# Patient Record
Sex: MALE | Race: WHITE | NOT HISPANIC OR LATINO | URBAN - METROPOLITAN AREA
[De-identification: names, ages, dates, MRNs, and addresses within clinical notes are randomized per-mention and may not be internally consistent; named-entity substitution may affect disease eponyms.]

---

## 2020-01-17 ENCOUNTER — EMERGENCY (EMERGENCY)
Facility: HOSPITAL | Age: 45
LOS: 1 days | Discharge: HOME | End: 2020-01-17
Attending: EMERGENCY MEDICINE
Payer: COMMERCIAL

## 2020-01-17 VITALS
RESPIRATION RATE: 18 BRPM | WEIGHT: 192.9 LBS | HEIGHT: 74 IN | DIASTOLIC BLOOD PRESSURE: 83 MMHG | SYSTOLIC BLOOD PRESSURE: 125 MMHG | TEMPERATURE: 98 F | OXYGEN SATURATION: 98 % | HEART RATE: 64 BPM

## 2020-01-17 LAB
ALBUMIN SERPL ELPH-MCNC: 4.6 G/DL — SIGNIFICANT CHANGE UP (ref 3.3–5)
ALP SERPL-CCNC: 61 U/L — SIGNIFICANT CHANGE UP (ref 40–120)
ALT FLD-CCNC: 17 U/L — SIGNIFICANT CHANGE UP (ref 10–45)
ANION GAP SERPL CALC-SCNC: 16 MMOL/L — SIGNIFICANT CHANGE UP (ref 5–17)
APTT BLD: 32.1 SEC — SIGNIFICANT CHANGE UP (ref 27.5–36.3)
AST SERPL-CCNC: 20 U/L — SIGNIFICANT CHANGE UP (ref 10–40)
BASOPHILS # BLD AUTO: 0.04 K/UL — SIGNIFICANT CHANGE UP (ref 0–0.2)
BASOPHILS NFR BLD AUTO: 0.9 % — SIGNIFICANT CHANGE UP (ref 0–2)
BILIRUB SERPL-MCNC: 0.4 MG/DL — SIGNIFICANT CHANGE UP (ref 0.2–1.2)
BUN SERPL-MCNC: 17 MG/DL — SIGNIFICANT CHANGE UP (ref 7–23)
CALCIUM SERPL-MCNC: 9.6 MG/DL — SIGNIFICANT CHANGE UP (ref 8.4–10.5)
CHLORIDE SERPL-SCNC: 104 MMOL/L — SIGNIFICANT CHANGE UP (ref 96–108)
CO2 SERPL-SCNC: 21 MMOL/L — LOW (ref 22–31)
CREAT SERPL-MCNC: 0.98 MG/DL — SIGNIFICANT CHANGE UP (ref 0.5–1.3)
EOSINOPHIL # BLD AUTO: 0.18 K/UL — SIGNIFICANT CHANGE UP (ref 0–0.5)
EOSINOPHIL NFR BLD AUTO: 3.9 % — SIGNIFICANT CHANGE UP (ref 0–6)
GLUCOSE SERPL-MCNC: 127 MG/DL — HIGH (ref 70–99)
HCT VFR BLD CALC: 44.7 % — SIGNIFICANT CHANGE UP (ref 39–50)
HGB BLD-MCNC: 15 G/DL — SIGNIFICANT CHANGE UP (ref 13–17)
IMM GRANULOCYTES NFR BLD AUTO: 0.2 % — SIGNIFICANT CHANGE UP (ref 0–1.5)
INR BLD: 1.07 RATIO — SIGNIFICANT CHANGE UP (ref 0.88–1.16)
LYMPHOCYTES # BLD AUTO: 1.97 K/UL — SIGNIFICANT CHANGE UP (ref 1–3.3)
LYMPHOCYTES # BLD AUTO: 42.4 % — SIGNIFICANT CHANGE UP (ref 13–44)
MCHC RBC-ENTMCNC: 30.1 PG — SIGNIFICANT CHANGE UP (ref 27–34)
MCHC RBC-ENTMCNC: 33.6 GM/DL — SIGNIFICANT CHANGE UP (ref 32–36)
MCV RBC AUTO: 89.6 FL — SIGNIFICANT CHANGE UP (ref 80–100)
MONOCYTES # BLD AUTO: 0.64 K/UL — SIGNIFICANT CHANGE UP (ref 0–0.9)
MONOCYTES NFR BLD AUTO: 13.8 % — SIGNIFICANT CHANGE UP (ref 2–14)
NEUTROPHILS # BLD AUTO: 1.81 K/UL — SIGNIFICANT CHANGE UP (ref 1.8–7.4)
NEUTROPHILS NFR BLD AUTO: 38.8 % — LOW (ref 43–77)
NRBC # BLD: 0 /100 WBCS — SIGNIFICANT CHANGE UP (ref 0–0)
PLATELET # BLD AUTO: 235 K/UL — SIGNIFICANT CHANGE UP (ref 150–400)
POTASSIUM SERPL-MCNC: 4 MMOL/L — SIGNIFICANT CHANGE UP (ref 3.5–5.3)
POTASSIUM SERPL-SCNC: 4 MMOL/L — SIGNIFICANT CHANGE UP (ref 3.5–5.3)
PROT SERPL-MCNC: 7.4 G/DL — SIGNIFICANT CHANGE UP (ref 6–8.3)
PROTHROM AB SERPL-ACNC: 12.2 SEC — SIGNIFICANT CHANGE UP (ref 10–12.9)
RBC # BLD: 4.99 M/UL — SIGNIFICANT CHANGE UP (ref 4.2–5.8)
RBC # FLD: 11.7 % — SIGNIFICANT CHANGE UP (ref 10.3–14.5)
RH IG SCN BLD-IMP: POSITIVE — SIGNIFICANT CHANGE UP
SODIUM SERPL-SCNC: 141 MMOL/L — SIGNIFICANT CHANGE UP (ref 135–145)
WBC # BLD: 4.65 K/UL — SIGNIFICANT CHANGE UP (ref 3.8–10.5)
WBC # FLD AUTO: 4.65 K/UL — SIGNIFICANT CHANGE UP (ref 3.8–10.5)

## 2020-01-17 PROCEDURE — 99284 EMERGENCY DEPT VISIT MOD MDM: CPT

## 2020-01-17 PROCEDURE — 72141 MRI NECK SPINE W/O DYE: CPT | Mod: 26

## 2020-01-17 PROCEDURE — 70498 CT ANGIOGRAPHY NECK: CPT | Mod: 26

## 2020-01-17 PROCEDURE — 70450 CT HEAD/BRAIN W/O DYE: CPT | Mod: 26,59

## 2020-01-17 PROCEDURE — 72125 CT NECK SPINE W/O DYE: CPT | Mod: 26

## 2020-01-17 PROCEDURE — 70496 CT ANGIOGRAPHY HEAD: CPT | Mod: 26

## 2020-01-17 NOTE — ED PROVIDER NOTE - NS ED ROS FT
Constitutional: no fevers or chills  HEENT: no visual changes, no sore throat, no rhinorrhea  NECK: neck pain  CV: no cp or palpitations  Resp: no sob or cough  GI: no abd pain, no nausea, no vomiting, no diarrhea, no constipation  : no dysuria, no hematuria  MSK: no myalgais or arthralgias  skin: no rashes  neuro: no HA, Right hand numbness; no weakness, no tingling  ROS statement: all other ROS negative except as per HPI
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
## 2020-01-17 NOTE — ED PROVIDER NOTE - PATIENT PORTAL LINK FT
You can access the FollowMyHealth Patient Portal offered by [facility] by registering at the following website: [email or web address]. By joining [facility]’s FollowMyHealth portal, you will also be able to view your health information using other applications (apps) compatible with our system.
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
## 2020-01-17 NOTE — ED PROVIDER NOTE - CARE PLAN
Principal Discharge DX:	Closed nondisplaced fracture of seventh cervical vertebra, unspecified fracture morphology, initial encounter

## 2020-01-17 NOTE — ED PROVIDER NOTE - ADDITIONAL NOTES AND INSTRUCTIONS:
No heavy lifting more than 5 pounds. No contact sports. Please keep the cervical collar on at all times until you can follow up with the orthopedic spine surgeon.

## 2020-01-17 NOTE — ED ADULT NURSE NOTE - PMH
Alcohol abuse    Anxiety disorder    Depression    DTs (delirium tremens)    Opiate addiction    Thrombocythemia
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
## 2020-01-17 NOTE — ED PROVIDER NOTE - NSFOLLOWUPINSTRUCTIONS_ED_ALL_ED_FT
Please take Acetaminophen over the counter. Use as directed and see medication warnings.  Please take Ibuprofen over the counter. Use as directed and see medication warnings.    Please keep your Cervical Collar on at all times until you can see the orthopedic spine doctors in 1 week.  The phone number is [phone]. Please call first thing to make an appointment.   [address], [place] [address]    Please follow up with your primary care physician.  Please bring a copy of your results with you.  Please return to the emergency department for worsening of your symptoms. Please take Acetaminophen over the counter. Use as directed and see medication warnings.  Please take Ibuprofen over the counter. Use as directed and see medication warnings.    Please keep your Cervical Collar on at all times until you can see the orthopedic spine doctor, Dr. Blankenship, in 1 week.  The phone number is [phone]. Please call first thing to make an appointment.   [address] [facility] [address], [place] [address]    Please follow up with your primary care physician.  Please bring a copy of your results with you.  Please return to the emergency department for worsening of your symptoms.
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
## 2020-01-17 NOTE — ED PROVIDER NOTE - PROGRESS NOTE DETAILS
Cliff Queen MD. consulted [facility] spine and trauma. will see patient Cliff Queen MD. pt pending official MRI read. Ortho's final recs pending final MRI read. Spoke w/ radiology resident and requested to send MRI to [facility]. explained to patient and patient's wife at bedside. Cliff Queen MD. discussed MRI and CT results with patient. spoke w/ ortho resident who states pt can be discharged and to f/u in 1 week with C-collar at all times. Instructed pt to keep C-collar on at all times. pt agreeable. strict return precautions provided. pt to be discharged.

## 2020-01-17 NOTE — ED PROVIDER NOTE - PHYSICAL EXAMINATION
PHYSICAL EXAM:  GENERAL: non-toxic appearing; in no respiratory distress  HEAD: Atraumatic, Normocephalic;  NECK: No JVD; FROM  EYES: PERRL, EOMs intact b/l w/out deficits  CHEST/LUNG: CTAB no wheezes/rhonchi/rales  HEART: RRR no murmur/gallops/rubs  ABDOMEN: +BS, soft, NT, ND  EXTREMITIES: No LE edema, +2 radial pulses b/l; carotid pulses 2+ bilaterally, equal;   MUSCULOSKELETAL: FROM of all 4 extremities; tenderness to Right neck   NERVOUS SYSTEM:  A&Ox3, decreased sensation to right hand radial aspect vs left; strength 5/5 in all 4's; CNII-XII intact;   SKIN:  No new rashes PHYSICAL EXAM:  GENERAL: non-toxic appearing; in no respiratory distress  HEAD: Atraumatic, Normocephalic;  NECK: No JVD; limited ROM due to pain;   EYES: PERRL, EOMs intact b/l w/out deficits  CHEST/LUNG: CTAB no wheezes/rhonchi/rales  HEART: RRR no murmur/gallops/rubs  ABDOMEN: +BS, soft, NT, ND  EXTREMITIES: No LE edema, +2 radial pulses b/l; carotid pulses 2+ bilaterally, equal;   MUSCULOSKELETAL: FROM of all 4 extremities; tenderness to Right neck at around C6/C7 region  NERVOUS SYSTEM:  A&Ox3, decreased sensation to right hand radial aspect vs left; strength 5/5 in all 4's; CNII-XII intact;   SKIN:  No new rashes

## 2020-01-18 VITALS
RESPIRATION RATE: 16 BRPM | HEART RATE: 60 BPM | OXYGEN SATURATION: 99 % | TEMPERATURE: 98 F | DIASTOLIC BLOOD PRESSURE: 84 MMHG | SYSTOLIC BLOOD PRESSURE: 121 MMHG

## 2020-01-18 LAB — BLD GP AB SCN SERPL QL: NEGATIVE — SIGNIFICANT CHANGE UP

## 2020-01-18 PROCEDURE — 70496 CT ANGIOGRAPHY HEAD: CPT

## 2020-01-18 PROCEDURE — 86850 RBC ANTIBODY SCREEN: CPT

## 2020-01-18 PROCEDURE — 85730 THROMBOPLASTIN TIME PARTIAL: CPT

## 2020-01-18 PROCEDURE — 70498 CT ANGIOGRAPHY NECK: CPT

## 2020-01-18 PROCEDURE — 72125 CT NECK SPINE W/O DYE: CPT

## 2020-01-18 PROCEDURE — 71046 X-RAY EXAM CHEST 2 VIEWS: CPT

## 2020-01-18 PROCEDURE — 85610 PROTHROMBIN TIME: CPT

## 2020-01-18 PROCEDURE — 70450 CT HEAD/BRAIN W/O DYE: CPT

## 2020-01-18 PROCEDURE — 72040 X-RAY EXAM NECK SPINE 2-3 VW: CPT | Mod: 26

## 2020-01-18 PROCEDURE — 85027 COMPLETE CBC AUTOMATED: CPT

## 2020-01-18 PROCEDURE — 71046 X-RAY EXAM CHEST 2 VIEWS: CPT | Mod: 26

## 2020-01-18 PROCEDURE — 80053 COMPREHEN METABOLIC PANEL: CPT

## 2020-01-18 PROCEDURE — 99284 EMERGENCY DEPT VISIT MOD MDM: CPT | Mod: 25

## 2020-01-18 PROCEDURE — 86901 BLOOD TYPING SEROLOGIC RH(D): CPT

## 2020-01-18 PROCEDURE — 72040 X-RAY EXAM NECK SPINE 2-3 VW: CPT

## 2020-01-18 PROCEDURE — 72141 MRI NECK SPINE W/O DYE: CPT

## 2020-01-18 PROCEDURE — 86900 BLOOD TYPING SEROLOGIC ABO: CPT

## 2020-01-18 RX ORDER — IBUPROFEN 200 MG
600 TABLET ORAL ONCE
Refills: 0 | Status: COMPLETED | OUTPATIENT
Start: 2020-01-18 | End: 2020-01-18

## 2020-01-18 RX ADMIN — Medication 600 MILLIGRAM(S): at 06:53

## 2020-01-18 NOTE — CONSULT NOTE ADULT - ASSESSMENT
45 yo M w/ Nondisplaced fx through C7 pars, superior facet, and transverse process. Neurologically intact except for R C6/7 paresthesias.  -FU MRI C spine  -C collar at all times 43 yo M w/ Nondisplaced fx through C7 pars, superior facet, and transverse process. Neurologically intact without motor deficits.  -Nonoperative management  -C collar at all times  -Follow up outpatient with Dr. Blankenship in 1 week. Call [phone] for appointment
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
## 2020-01-23 NOTE — ED ADULT NURSE NOTE - NS PRO PASSIVE SMOKE EXP
Breathing spontaneous and unlabored. Breath sounds clear and equal bilaterally with regular rhythm. Unknown

## 2020-01-24 ENCOUNTER — APPOINTMENT (OUTPATIENT)
Dept: ORTHOPEDIC SURGERY | Facility: CLINIC | Age: 45
End: 2020-01-24
Payer: COMMERCIAL

## 2020-01-24 ENCOUNTER — CHART COPY (OUTPATIENT)
Age: 45
End: 2020-01-24

## 2020-01-24 VITALS — HEIGHT: 74 IN | WEIGHT: 193 LBS | BODY MASS INDEX: 24.77 KG/M2

## 2020-01-24 PROCEDURE — 99203 OFFICE O/P NEW LOW 30 MIN: CPT

## 2020-01-24 RX ORDER — METHYLPREDNISOLONE 4 MG/1
4 TABLET ORAL
Qty: 1 | Refills: 0 | Status: ACTIVE | COMMUNITY
Start: 2020-01-24 | End: 1900-01-01

## 2020-01-24 NOTE — DISCUSSION/SUMMARY
[de-identified] : We will continue with a cervical collar. Follow up in 4 weeks. She will also be placed on a Medrol Dosepak.
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
## 2020-01-24 NOTE — PHYSICAL EXAM
[de-identified] : He is in a well fitting cervical collar. Negative Spurlings. Negative Lhermitte's. Full range of motion bilateral shoulders without evidence of impingement. No instability of bilateral upper extremities.  Cranial nerves II through XII grossly intact. Intact sensation bilateral upper extremities. 5/5 deltoids biceps triceps wrist extensors wrist flexors finger flexors and hand intrinsics. 1+ biceps triceps and brachioradialis reflexes. Negative Christie's. 2+ radial pulse. Negative Tinel's over the cubital and carpal tunnel. No skin lesions on the right and left upper extremities. [de-identified] : Review of his CT scan and MRI demonstrate a nondisplaced facet fracture at C7. He has foraminal stenosis C5-6 and C6-7

## 2020-01-27 ENCOUNTER — CHART COPY (OUTPATIENT)
Age: 45
End: 2020-01-27

## 2020-01-28 RX ORDER — TIZANIDINE 2 MG/1
2 TABLET ORAL EVERY 8 HOURS
Qty: 90 | Refills: 0 | Status: ACTIVE | COMMUNITY
Start: 2020-01-28 | End: 1900-01-01

## 2020-01-30 RX ORDER — DICLOFENAC SODIUM 75 MG/1
75 TABLET, DELAYED RELEASE ORAL
Qty: 60 | Refills: 0 | Status: ACTIVE | COMMUNITY
Start: 2020-01-30 | End: 1900-01-01

## 2020-02-19 ENCOUNTER — APPOINTMENT (OUTPATIENT)
Dept: ORTHOPEDIC SURGERY | Facility: CLINIC | Age: 45
End: 2020-02-19
Payer: COMMERCIAL

## 2020-02-19 PROCEDURE — 99214 OFFICE O/P EST MOD 30 MIN: CPT

## 2020-02-19 NOTE — DISCUSSION/SUMMARY
[de-identified] : We again discussed nonsurgical and surgical options. He wishes to continue with nonsurgical care as he is feeling better. Followup in 6 weeks.

## 2020-02-19 NOTE — PHYSICAL EXAM
[de-identified] : He is in a well fitting cervical collar. Negative Spurlings. Negative Lhermitte's. Full range of motion bilateral shoulders without evidence of impingement. No instability of bilateral upper extremities.  Cranial nerves II through XII grossly intact. Intact sensation bilateral upper extremities. 5/5 deltoids biceps triceps wrist extensors wrist flexors finger flexors and hand intrinsics. 1+ biceps triceps and brachioradialis reflexes. Negative Christie's. 2+ radial pulse. Negative Tinel's over the cubital and carpal tunnel. No skin lesions on the right and left upper extremities. [de-identified] : Review of his CT scan and MRI demonstrate a nondisplaced facet fracture at C7. He has foraminal stenosis C5-6 and C6-7
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
## 2020-02-20 ENCOUNTER — TRANSCRIPTION ENCOUNTER (OUTPATIENT)
Age: 45
End: 2020-02-20

## 2020-03-18 ENCOUNTER — APPOINTMENT (OUTPATIENT)
Dept: ORTHOPEDIC SURGERY | Facility: CLINIC | Age: 45
End: 2020-03-18
Payer: COMMERCIAL

## 2020-03-18 PROCEDURE — 99214 OFFICE O/P EST MOD 30 MIN: CPT
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
## 2020-03-18 NOTE — DISCUSSION/SUMMARY
[de-identified] : Radicular symptoms continue to significantly improve. We again reviewed nonsurgical and surgical options. He will continue with nonsurgical treatment. Followup in 4 weeks at which time we will obtain flexion-extension films of the cervical spine out of collar

## 2020-03-18 NOTE — PHYSICAL EXAM
[de-identified] : He is in a well fitting cervical collar. Negative Spurlings. Negative Lhermitte's. Full range of motion bilateral shoulders without evidence of impingement. No instability of bilateral upper extremities.  Cranial nerves II through XII grossly intact. Intact sensation bilateral upper extremities. 5/5 deltoids biceps triceps wrist extensors wrist flexors finger flexors and hand intrinsics. 1+ biceps triceps and brachioradialis reflexes. Negative Christie's. 2+ radial pulse. Negative Tinel's over the cubital and carpal tunnel. No skin lesions on the right and left upper extremities. [de-identified] : Review of his CT scan and MRI demonstrate a nondisplaced facet fracture at C7. He has foraminal stenosis C5-6 and C6-7

## 2020-04-20 ENCOUNTER — APPOINTMENT (OUTPATIENT)
Dept: ORTHOPEDIC SURGERY | Facility: CLINIC | Age: 45
End: 2020-04-20
Payer: COMMERCIAL

## 2020-04-20 VITALS
WEIGHT: 193 LBS | TEMPERATURE: 95.5 F | SYSTOLIC BLOOD PRESSURE: 123 MMHG | DIASTOLIC BLOOD PRESSURE: 79 MMHG | HEIGHT: 74 IN | BODY MASS INDEX: 24.77 KG/M2 | HEART RATE: 75 BPM

## 2020-04-20 PROCEDURE — 99214 OFFICE O/P EST MOD 30 MIN: CPT

## 2020-04-20 PROCEDURE — 72040 X-RAY EXAM NECK SPINE 2-3 VW: CPT

## 2020-04-20 NOTE — PHYSICAL EXAM
[Ataxic] : not ataxic [de-identified] : He is in a well fitting cervical collar. After removal of the collar he does not have any significant posterior cervical tenderness. Decreased range of motion of the cervical spine. Negative Spurlings. Negative Lhermitte's. Full range of motion bilateral shoulders without evidence of impingement. No instability of bilateral upper extremities.  Cranial nerves II through XII grossly intact. Intact sensation bilateral upper extremities. 5/5 deltoids biceps triceps wrist extensors wrist flexors finger flexors and hand intrinsics. 1+ biceps triceps and brachioradialis reflexes. Negative Christie's. 2+ radial pulse. Negative Tinel's over the cubital and carpal tunnel. No skin lesions on the right and left upper extremities. [de-identified] : Review of his CT scan and MRI demonstrate a nondisplaced facet fracture at C7. He has foraminal stenosis C5-6 and C6-7\par \par AP and flexion extension views of the cervical spine did not reveal any gross instability.

## 2020-04-20 NOTE — HISTORY OF PRESENT ILLNESS
[de-identified] : Mr. DANTE CONTRERAS  is a 44 year old male who presents for follow up.  He has some stiffness in his neck. Right upper extremity symptoms have significantly improved.\par \par

## 2020-04-20 NOTE — DISCUSSION/SUMMARY
[de-identified] : He went from a cervical collar work on some home exercises. CT scan will be obtained to evaluate healing. Followup afterwards.

## 2020-04-22 ENCOUNTER — APPOINTMENT (OUTPATIENT)
Dept: CT IMAGING | Facility: CLINIC | Age: 45
End: 2020-04-22
Payer: COMMERCIAL

## 2020-04-22 ENCOUNTER — RESULT REVIEW (OUTPATIENT)
Age: 45
End: 2020-04-22

## 2020-04-22 ENCOUNTER — OUTPATIENT (OUTPATIENT)
Dept: OUTPATIENT SERVICES | Facility: HOSPITAL | Age: 45
LOS: 1 days | End: 2020-04-22
Payer: COMMERCIAL

## 2020-04-22 DIAGNOSIS — M54.12 RADICULOPATHY, CERVICAL REGION: ICD-10-CM

## 2020-04-22 PROCEDURE — 72125 CT NECK SPINE W/O DYE: CPT

## 2020-04-22 PROCEDURE — 72125 CT NECK SPINE W/O DYE: CPT | Mod: 26

## 2020-04-22 PROCEDURE — 76376 3D RENDER W/INTRP POSTPROCES: CPT | Mod: 26

## 2020-04-22 PROCEDURE — 76376 3D RENDER W/INTRP POSTPROCES: CPT

## 2020-06-10 ENCOUNTER — APPOINTMENT (OUTPATIENT)
Dept: ORTHOPEDIC SURGERY | Facility: CLINIC | Age: 45
End: 2020-06-10
Payer: COMMERCIAL

## 2020-06-10 VITALS
DIASTOLIC BLOOD PRESSURE: 71 MMHG | SYSTOLIC BLOOD PRESSURE: 111 MMHG | HEIGHT: 74 IN | BODY MASS INDEX: 24.77 KG/M2 | WEIGHT: 193 LBS | HEART RATE: 63 BPM

## 2020-06-10 VITALS — TEMPERATURE: 98.1 F

## 2020-06-10 PROCEDURE — 99214 OFFICE O/P EST MOD 30 MIN: CPT

## 2020-08-10 ENCOUNTER — APPOINTMENT (OUTPATIENT)
Dept: ORTHOPEDIC SURGERY | Facility: CLINIC | Age: 45
End: 2020-08-10
Payer: COMMERCIAL

## 2020-08-10 VITALS — TEMPERATURE: 97.7 F

## 2020-08-10 PROCEDURE — 99214 OFFICE O/P EST MOD 30 MIN: CPT

## 2020-08-10 NOTE — PHYSICAL EXAM
[Ataxic] : not ataxic [de-identified] : Decreased range of motion of the cervical spine. Negative Spurlings. Negative Lhermitte's. Full range of motion bilateral shoulders without evidence of impingement. No instability of bilateral upper extremities.  Cranial nerves II through XII grossly intact. Intact sensation bilateral upper extremities. 5/5 deltoids biceps triceps wrist extensors wrist flexors finger flexors and hand intrinsics. 1+ biceps triceps and brachioradialis reflexes. Negative Christie's. 2+ radial pulse. Negative Tinel's over the cubital and carpal tunnel. No skin lesions on the right and left upper extremities. [de-identified] : EXAM: CT 3D RECONSTRUCT ANGELY DOMINGUEZ \par \par EXAM: CT CERVICAL SPINE \par \par \par PROCEDURE DATE: 04/22/2020 \par \par \par \par INTERPRETATION: CLINICAL INDICATION: Follow-up C7 facet fracture. \par \par TECHNIQUE: CT of the cervical spine was performed without the administration \par of intravenous contrast, according to standard protocol. 3-D reconstructions \par were performed on a separate workstation and reviewed. \par \par \par COMPARISON: CT cervical spine 1/17/2020. \par \par FINDINGS: \par \par ALIGNMENT: Straightening of the cervical spine with minimal retrolisthesis \par of C5 on C6. \par \par VERTEBRAE: There is a healing nondisplaced fracture of the right C7 pars \par interarticularis and superior articulating facet with extension into the \par right transverse process and right foramen transversarium (series 4 images \par [id number]), in stable alignment compared to prior imaging. \par \par The vertebral bodies are normal in height. There is no no evidence of acute \par fracture or aggressive osseous lesion. \par \par DISCS: There is mild loss of intervertebral disc space height at C5-C6 and \par C6-C7 levels. \par \par PARAVERTEBRAL SOFT TISSUES: No evidence of traumatic injury to the \par paravertebral soft tissues. \par \par EVALUATION OF INDIVIDUAL LEVELS DEMONSTRATES: \par C2-3: No spinal canal or neuroforaminal stenosis. \par \par C3-4: No spinal canal or neuroforaminal stenosis. \par \par C4-5: Minimal disc bulge. No spinal canal or neuroforaminal stenosis. \par \par C5-6: Minimal retrolisthesis of C5 on C6. Disc bulge and degenerative \par changes of the facet and uncovertebral joints resulting in mild canal \par stenosis and moderate left, mild to moderate right neuroforaminal narrowing. \par \par C6-7: Disc bulge and degenerative changes of the facet and uncovertebral \par joints resulting in mild canal stenosis and moderate left, mild right \par neuroforaminal narrowing. \par \par C7-T1: No spinal canal or neuroforaminal stenosis. \par \par \par IMPRESSION: \par \par Healing nondisplaced fracture of the right C7 pars interarticularis and \par superior articulating facet with extension into the right transverse process \par and right foramen transversarium in stable alignment compared to prior \par imaging. \par \par \par \par \par QASIM BARBA M.D., ATTENDING RADIOLOGIST \par This document has been electronically signed. Apr 22 2020 10:51AM\par Review of his CT scan and MRI demonstrate a nondisplaced facet fracture at C7. He has foraminal stenosis C5-6 and C6-7\par \par AP and flexion extension views of the cervical spine did not reveal any gross instability.

## 2020-08-10 NOTE — HISTORY OF PRESENT ILLNESS
[de-identified] : Mr. DANTE CONTRERAS  is a 44 year old male who presents for a follow up visit.. Overall he is doing well.  His ROM is slowly improving and his arm pain has resolved.  He has numbness/tingling in the tip of his right thumb and index finger.  He will take nsaids as needed.  \par

## 2020-10-12 ENCOUNTER — APPOINTMENT (OUTPATIENT)
Dept: ORTHOPEDIC SURGERY | Facility: CLINIC | Age: 45
End: 2020-10-12
Payer: COMMERCIAL

## 2020-10-12 PROCEDURE — 99214 OFFICE O/P EST MOD 30 MIN: CPT

## 2020-10-12 NOTE — DISCUSSION/SUMMARY
[de-identified] : Overall he continues to improve with physical therapy.  He will continue with this.  Follow-up in 3 to 4 months.

## 2020-10-12 NOTE — HISTORY OF PRESENT ILLNESS
[de-identified] : Mr. DANTE CONTRERAS  is a 44 year old male who presents for a follow up visit.. Overall he is doing well.  His ROM is significantly improved with physical therapy.  He has numbness/tingling in the tip of his right thumb and index finger.  He will take nsaids as needed.  \par

## 2020-10-12 NOTE — PHYSICAL EXAM
[Ataxic] : not ataxic [de-identified] : Improved range of motion of the cervical spine. Negative Spurlings. Negative Lhermitte's. Full range of motion bilateral shoulders without evidence of impingement. No instability of bilateral upper extremities.  Cranial nerves II through XII grossly intact. Intact sensation bilateral upper extremities. 5/5 deltoids biceps triceps wrist extensors wrist flexors finger flexors and hand intrinsics. 1+ biceps triceps and brachioradialis reflexes. Negative Christie's. 2+ radial pulse. Negative Tinel's over the cubital and carpal tunnel. No skin lesions on the right and left upper extremities. [de-identified] : EXAM: CT 3D RECONSTRUCT ANGELY DOMINGUEZ \par \par EXAM: CT CERVICAL SPINE \par \par \par PROCEDURE DATE: 04/22/2020 \par \par \par \par INTERPRETATION: CLINICAL INDICATION: Follow-up C7 facet fracture. \par \par TECHNIQUE: CT of the cervical spine was performed without the administration \par of intravenous contrast, according to standard protocol. 3-D reconstructions \par were performed on a separate workstation and reviewed. \par \par \par COMPARISON: CT cervical spine 1/17/2020. \par \par FINDINGS: \par \par ALIGNMENT: Straightening of the cervical spine with minimal retrolisthesis \par of C5 on C6. \par \par VERTEBRAE: There is a healing nondisplaced fracture of the right C7 pars \par interarticularis and superior articulating facet with extension into the \par right transverse process and right foramen transversarium (series 4 images \par 169-175), in stable alignment compared to prior imaging. \par \par The vertebral bodies are normal in height. There is no no evidence of acute \par fracture or aggressive osseous lesion. \par \par DISCS: There is mild loss of intervertebral disc space height at C5-C6 and \par C6-C7 levels. \par \par PARAVERTEBRAL SOFT TISSUES: No evidence of traumatic injury to the \par paravertebral soft tissues. \par \par EVALUATION OF INDIVIDUAL LEVELS DEMONSTRATES: \par C2-3: No spinal canal or neuroforaminal stenosis. \par \par C3-4: No spinal canal or neuroforaminal stenosis. \par \par C4-5: Minimal disc bulge. No spinal canal or neuroforaminal stenosis. \par \par C5-6: Minimal retrolisthesis of C5 on C6. Disc bulge and degenerative \par changes of the facet and uncovertebral joints resulting in mild canal \par stenosis and moderate left, mild to moderate right neuroforaminal narrowing. \par \par C6-7: Disc bulge and degenerative changes of the facet and uncovertebral \par joints resulting in mild canal stenosis and moderate left, mild right \par neuroforaminal narrowing. \par \par C7-T1: No spinal canal or neuroforaminal stenosis. \par \par \par IMPRESSION: \par \par Healing nondisplaced fracture of the right C7 pars interarticularis and \par superior articulating facet with extension into the right transverse process \par and right foramen transversarium in stable alignment compared to prior \par imaging. \par \par \par \par \par QASIM BARBA M.D., ATTENDING RADIOLOGIST \par This document has been electronically signed. Apr 22 2020 10:51AM\par Review of his CT scan and MRI demonstrate a nondisplaced facet fracture at C7. He has foraminal stenosis C5-6 and C6-7\par \par AP and flexion extension views of the cervical spine did not reveal any gross instability.

## 2021-01-25 ENCOUNTER — APPOINTMENT (OUTPATIENT)
Dept: ORTHOPEDIC SURGERY | Facility: CLINIC | Age: 46
End: 2021-01-25
Payer: COMMERCIAL

## 2021-01-25 VITALS
HEART RATE: 62 BPM | BODY MASS INDEX: 24.38 KG/M2 | HEIGHT: 74 IN | WEIGHT: 190 LBS | SYSTOLIC BLOOD PRESSURE: 114 MMHG | DIASTOLIC BLOOD PRESSURE: 75 MMHG

## 2021-01-25 VITALS — TEMPERATURE: 96 F

## 2021-01-25 DIAGNOSIS — M48.02 SPINAL STENOSIS, CERVICAL REGION: ICD-10-CM

## 2021-01-25 DIAGNOSIS — M54.12 RADICULOPATHY, CERVICAL REGION: ICD-10-CM

## 2021-01-25 PROCEDURE — 99213 OFFICE O/P EST LOW 20 MIN: CPT

## 2021-01-25 PROCEDURE — 99072 ADDL SUPL MATRL&STAF TM PHE: CPT

## 2021-01-25 NOTE — DISCUSSION/SUMMARY
[de-identified] : He will continue with his activities.  Follow-up in 1 years time or sooner with any changes or worsening of his symptoms.

## 2021-01-25 NOTE — PHYSICAL EXAM
[Ataxic] : not ataxic [de-identified] : Improved range of motion of the cervical spine. Negative Spurlings. Negative Lhermitte's. Full range of motion bilateral shoulders without evidence of impingement. No instability of bilateral upper extremities.  Cranial nerves II through XII grossly intact. Intact sensation bilateral upper extremities. 5/5 deltoids biceps triceps wrist extensors wrist flexors finger flexors and hand intrinsics. 1+ biceps triceps and brachioradialis reflexes. Negative Christie's. 2+ radial pulse. Negative Tinel's over the cubital and carpal tunnel. No skin lesions on the right and left upper extremities. [de-identified] : EXAM: CT 3D RECONSTRUCT ANGELY DOMINGUEZ \par \par EXAM: CT CERVICAL SPINE \par \par \par PROCEDURE DATE: 04/22/2020 \par \par \par \par INTERPRETATION: CLINICAL INDICATION: Follow-up C7 facet fracture. \par \par TECHNIQUE: CT of the cervical spine was performed without the administration \par of intravenous contrast, according to standard protocol. 3-D reconstructions \par were performed on a separate workstation and reviewed. \par \par \par COMPARISON: CT cervical spine 1/17/2020. \par \par FINDINGS: \par \par ALIGNMENT: Straightening of the cervical spine with minimal retrolisthesis \par of C5 on C6. \par \par VERTEBRAE: There is a healing nondisplaced fracture of the right C7 pars \par interarticularis and superior articulating facet with extension into the \par right transverse process and right foramen transversarium (series 4 images \par 169-175), in stable alignment compared to prior imaging. \par \par The vertebral bodies are normal in height. There is no no evidence of acute \par fracture or aggressive osseous lesion. \par \par DISCS: There is mild loss of intervertebral disc space height at C5-C6 and \par C6-C7 levels. \par \par PARAVERTEBRAL SOFT TISSUES: No evidence of traumatic injury to the \par paravertebral soft tissues. \par \par EVALUATION OF INDIVIDUAL LEVELS DEMONSTRATES: \par C2-3: No spinal canal or neuroforaminal stenosis. \par \par C3-4: No spinal canal or neuroforaminal stenosis. \par \par C4-5: Minimal disc bulge. No spinal canal or neuroforaminal stenosis. \par \par C5-6: Minimal retrolisthesis of C5 on C6. Disc bulge and degenerative \par changes of the facet and uncovertebral joints resulting in mild canal \par stenosis and moderate left, mild to moderate right neuroforaminal narrowing. \par \par C6-7: Disc bulge and degenerative changes of the facet and uncovertebral \par joints resulting in mild canal stenosis and moderate left, mild right \par neuroforaminal narrowing. \par \par C7-T1: No spinal canal or neuroforaminal stenosis. \par \par \par IMPRESSION: \par \par Healing nondisplaced fracture of the right C7 pars interarticularis and \par superior articulating facet with extension into the right transverse process \par and right foramen transversarium in stable alignment compared to prior \par imaging. \par \par \par \par \par QASIM BARBA M.D., ATTENDING RADIOLOGIST \par This document has been electronically signed. Apr 22 2020 10:51AM\par Review of his CT scan and MRI demonstrate a nondisplaced facet fracture at C7. He has foraminal stenosis C5-6 and C6-7\par \par AP and flexion extension views of the cervical spine did not reveal any gross instability.

## 2023-03-15 NOTE — HISTORY OF PRESENT ILLNESS
[de-identified] : Mr. DANTE CONTRERAS  is a 44 year old male who presents for a follow up visit.. Overall he is doing well.  He has some neck pain.   He is back to all normal activities. .  \par  no

## 2023-04-09 ENCOUNTER — NON-APPOINTMENT (OUTPATIENT)
Age: 48
End: 2023-04-09

## 2024-02-05 ENCOUNTER — NON-APPOINTMENT (OUTPATIENT)
Age: 49
End: 2024-02-05